# Patient Record
Sex: MALE
[De-identification: names, ages, dates, MRNs, and addresses within clinical notes are randomized per-mention and may not be internally consistent; named-entity substitution may affect disease eponyms.]

---

## 2022-03-07 ENCOUNTER — NURSE TRIAGE (OUTPATIENT)
Dept: OTHER | Facility: CLINIC | Age: 32
End: 2022-03-07

## 2022-03-07 NOTE — TELEPHONE ENCOUNTER
Subjective: Caller states \"sinus infection for a couple days. \"     Current Symptoms:  Head congestion, sinus headache, sneezing, mild coughing, drainage. Green mucous. covid negative. Headache. Onset: 2 days ago; gradual, worsening    Pain Severity: 4/10; aching; intermittent, sinus headache. Temperature: denies by parent's tactile estimate    What has been tried: nothing    Recommended disposition: Justin Garcia [id number] advice provided, patient verbalizes understanding; denies any other questions or concerns; instructed to call back for any new or worsening symptoms. home care    This triage is a result of a call to [address] [place] [address]. Please do not respond to the triage nurse through this encounter. Any subsequent communication should be directly with the patient.       Reason for Disposition   [1] Sinus congestion as part of a cold AND [2] present < 10 days    Protocols used: SINUS PAIN OR CONGESTION-ADULT-